# Patient Record
Sex: MALE | Race: WHITE | HISPANIC OR LATINO | Employment: OTHER | URBAN - METROPOLITAN AREA
[De-identification: names, ages, dates, MRNs, and addresses within clinical notes are randomized per-mention and may not be internally consistent; named-entity substitution may affect disease eponyms.]

---

## 2023-04-25 ENCOUNTER — OFFICE VISIT (OUTPATIENT)
Dept: URGENT CARE | Facility: CLINIC | Age: 48
End: 2023-04-25
Payer: OTHER MISCELLANEOUS

## 2023-04-25 VITALS
SYSTOLIC BLOOD PRESSURE: 120 MMHG | RESPIRATION RATE: 18 BRPM | OXYGEN SATURATION: 97 % | BODY MASS INDEX: 27.83 KG/M2 | DIASTOLIC BLOOD PRESSURE: 81 MMHG | TEMPERATURE: 98 F | WEIGHT: 163 LBS | HEART RATE: 76 BPM | HEIGHT: 64 IN

## 2023-04-25 DIAGNOSIS — M79.645 FINGER PAIN, LEFT: Primary | ICD-10-CM

## 2023-04-25 DIAGNOSIS — Z02.6 ENCOUNTER RELATED TO WORKER'S COMPENSATION CLAIM: ICD-10-CM

## 2023-04-25 PROCEDURE — 73130 X-RAY EXAM OF HAND: CPT | Mod: FY,LT,S$GLB, | Performed by: RADIOLOGY

## 2023-04-25 PROCEDURE — 73130 XR HAND COMPLETE 3 VIEW LEFT: ICD-10-PCS | Mod: FY,LT,S$GLB, | Performed by: RADIOLOGY

## 2023-04-25 PROCEDURE — 99203 OFFICE O/P NEW LOW 30 MIN: CPT | Mod: S$GLB,,,

## 2023-04-25 PROCEDURE — 99203 PR OFFICE/OUTPT VISIT, NEW, LEVL III, 30-44 MIN: ICD-10-PCS | Mod: S$GLB,,,

## 2023-04-25 NOTE — PROGRESS NOTES
Subjective:      Patient ID: Jax Mehta is a 47 y.o. male.    Chief Complaint: Hand Pain (Left pinky finger injury)    47 yr male pt presents to the clinic with left pinky finger pain. Pt states that he had a accident while he was at work and a co worker was making a hole and the drill bit grabbed his glove and twisted his finger and it dislocated his finger. Pt reports that he noticed his finger was dislocated and popped it back into place on Friday. Pt reports that since Friday he is still having pain especially when he hits it on something. Pt states that he can move the finger but it is hard to make a fist. Pt also reports swelling behind his left pinky finger. Pt denies any numbness or tingling. Treatments at home include icing and tylenol with no relief.  #[id number]    Hand Pain   The incident occurred 3 to 5 days ago. The incident occurred at work. The injury mechanism was twisted. The pain is present in the left fingers. The quality of the pain is described as aching. The pain does not radiate. The pain is at a severity of 7/10. The pain is moderate. The pain has been Fluctuating since the incident. Pertinent negatives include no chest pain, numbness or tingling. The symptoms are aggravated by movement. He has tried ice and acetaminophen for the symptoms. The treatment provided no relief.     Cardiovascular:  Negative for chest pain.   Musculoskeletal:  Positive for pain and abnormal ROM of joint.   Skin:  Negative for erythema.   Neurological:  Negative for numbness.   Objective:     Physical Exam  Constitutional:       General: He is not in acute distress.     Appearance: Normal appearance. He is normal weight. He is not ill-appearing.   HENT:      Head: Normocephalic and atraumatic.      Mouth/Throat:      Mouth: Mucous membranes are moist.   Eyes:      Extraocular Movements: Extraocular movements intact.      Pupils: Pupils are equal, round, and reactive to light.   Cardiovascular:       Rate and Rhythm: Normal rate and regular rhythm.      Heart sounds: Normal heart sounds. No murmur heard.  Pulmonary:      Effort: No respiratory distress.      Breath sounds: Normal breath sounds. No stridor. No wheezing, rhonchi or rales.   Musculoskeletal:      Left hand: Swelling and tenderness present. No bony tenderness. Normal strength. Normal capillary refill. Normal pulse.      Comments: TTP to left fifth finger, swelling throughout, cap refill <2 seconds, FROM (pain with flexion), radial pulse 2+   Skin:     General: Skin is warm and dry.      Capillary Refill: Capillary refill takes less than 2 seconds.      Findings: No bruising or erythema.   Neurological:      General: No focal deficit present.      Mental Status: He is alert and oriented to person, place, and time.    XR HAND COMPLETE 3 VIEW LEFT    Result Date: 4/25/2023  EXAMINATION: XR HAND COMPLETE 3 VIEW LEFT CLINICAL HISTORY: . Pain in left hand TECHNIQUE: PA, lateral, and oblique views of the left hand were performed. COMPARISON: None FINDINGS: Phalanges metacarpals and carpal bones are intact.  No significant bony abnormalities seen.     See above Electronically signed by: Reggie Carias MD Date:    04/25/2023 Time:    10:06     Assessment:      1. Finger pain, left      Plan:     Pt in no acute distress. Vitals reassuring. Reviewed negative xray of hand with pt. Discussed treatment with RICE, Tylenol/Motrin OTC and ghislaine tape. Discussed f/u with [facility] tomorrow, gave pt phone number to call and schedule appt. Discussed the importance of further evaluation if symptoms worsen. Patient stated verbal understanding.              No follow-ups on file.

## 2023-04-25 NOTE — PROGRESS NOTES
Subjective:      Patient ID: Jax Mehta is a 47 y.o. male.    Vitals:  vitals were not taken for this visit.     Chief Complaint: Hand Pain (Left pinky finger injury)    47 yr male present with left hand pink finger pain. Pt states that he had a accident he was at work and a co worker was making a hole and the drill bit grabbed his glove and twisted his finger and it dislocated his finger. So  he had to pop it back in place. Pt states that he can move the finger but is unable to make a fist. Finger appears to be swollen   Finger injury happened about Friday or Saturday last week.   Treatments at home include icing and tylenol         #[id number]    Hand Pain   The incident occurred more than 1 week ago. The incident occurred at work. The injury mechanism was twisted. The pain is present in the left hand. The quality of the pain is described as aching. The pain does not radiate. The pain is at a severity of 7/10. The pain has been Fluctuating since the incident. Nothing aggravates the symptoms. He has tried ice and acetaminophen for the symptoms. The treatment provided no relief.     ROS   Objective:     Physical Exam    Assessment:     No diagnosis found.    Plan:       There are no diagnoses linked to this encounter.

## 2023-04-25 NOTE — PATIENT INSTRUCTIONS
R.I.C.E:    Rest, apply ice intermittently, compress with ace wrap, and elevate above heart level as much as possible.  Do not apply ice directly to skin. Wrap ice in cloth before applying.    OTC Tylenol (acetaminophen) up to 4,000 mg a day is safe for short periods and can be used for pain, and fever. However in high doses and prolonged use it can cause liver irritation.    OTC Ibuprofen (NSAID) is a non-steroidal anti-inflammatory that can be used for pain. However it can also cause stomach irritation if over used.    Of note: Aleve, Motrin, Advil, Mobic, meloxicam, and indomethacin are also other names of NSAIDs.    Follow up with Occupational [facility]. Call [phone] to make appt for tomorrow.

## 2023-04-25 NOTE — LETTER
[facility]  [address] [place] [address]  Phone: [phone]  Fax: [phone]  [facility]: [email or web address]    Pt Name: Jax Mehta  Injury Date: 04/21/2023   Employee ID:  Date of First Treatment: 04/25/2023   Company: [employer] to [employer]       Appointment Time: 08:30 AM Arrived: 0905   Provider: Tita Valdez NP Time Out:1035     Office Treatment:   1. Finger pain, left                     Return Appointment: TBD by [facility]    [place]

## 2023-09-06 ENCOUNTER — HOSPITAL ENCOUNTER (EMERGENCY)
Facility: HOSPITAL | Age: 48
Discharge: HOME | End: 2023-09-06
Attending: EMERGENCY MEDICINE

## 2023-09-06 VITALS
DIASTOLIC BLOOD PRESSURE: 71 MMHG | TEMPERATURE: 99 F | HEART RATE: 63 BPM | OXYGEN SATURATION: 98 % | RESPIRATION RATE: 16 BRPM | BODY MASS INDEX: 28.49 KG/M2 | SYSTOLIC BLOOD PRESSURE: 120 MMHG | WEIGHT: 166 LBS

## 2023-09-06 DIAGNOSIS — M25.522 LEFT ELBOW PAIN: Primary | ICD-10-CM

## 2023-09-06 DIAGNOSIS — M25.522 ELBOW PAIN, LEFT: ICD-10-CM

## 2023-09-06 PROCEDURE — 99283 EMERGENCY DEPT VISIT LOW MDM: CPT

## 2023-09-06 PROCEDURE — 25000003 PHARM REV CODE 250

## 2023-09-06 RX ORDER — IBUPROFEN 600 MG/1
600 TABLET ORAL
Status: COMPLETED | OUTPATIENT
Start: 2023-09-06 | End: 2023-09-06

## 2023-09-06 RX ORDER — NAPROXEN 500 MG/1
500 TABLET ORAL 2 TIMES DAILY
Qty: 20 TABLET | Refills: 0 | Status: SHIPPED | OUTPATIENT
Start: 2023-09-06 | End: 2023-09-16

## 2023-09-06 RX ADMIN — IBUPROFEN 600 MG: 600 TABLET, FILM COATED ORAL at 12:09

## 2023-09-06 NOTE — DISCHARGE INSTRUCTIONS
Please take Naproxen as needed for pain. See discharge instructions for exercises and more information on your elbow pain.    Thank you for allowing me and my emergency team to take care of you here today! I hope you feel better soon. Please do not hesitate to return with any additional concerns that may arise from this or any new problem you encounter.    Our goal in the emergency department is to always give you outstanding care and exceptional service. If you receive a survey by mail or e-mail in the next week regarding your experience in our ED, we would greatly appreciate you completing it. Your feedback provides us with a way to recognize our staff who give very good care and it helps us learn how to improve when your experience was below the excellence we aspire to be!    Kelley Hendrickson PA-C

## 2023-09-06 NOTE — ED PROVIDER NOTES
Encounter Date: 9/6/2023       History     Chief Complaint   Patient presents with    Elbow Pain    Shoulder Pain     Pt states that he has been having elbow and shoulder pain x's 4 days. Denies injury states that he does lifting at work, no relief with  home remedies      Patient is a 47 year old male with no significant past medical history who presents to the emergency room for left elbow pain that onset 4 days ago. Patient states within the last 2 days pain started radiating up shoulder. 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 Pain exacerbated with movement, reportedly 10/10, with no alleviating factors. Patient denies fever, tingling, headache, body aches, other joint pain, nausea, or vomiting. Prior treatment includes Ibuprofen and unknown topical cream without relief.     The history is provided by the patient. A  was used.     Review of patient's allergies indicates:  No Known Allergies  No past medical history on file.  No past surgical history on file.  No family history on file.  Social History     Tobacco Use    Smoking status: Never    Smokeless tobacco: Never   Substance Use Topics    Alcohol use: Yes    Drug use: Never     Review of Systems   Constitutional:  Negative for chills, diaphoresis, fatigue and fever.   HENT:  Negative for congestion and sore throat.    Respiratory:  Negative for cough and shortness of breath.    Cardiovascular:  Negative for chest pain and palpitations.   Gastrointestinal:  Negative for abdominal pain, nausea and vomiting.   Musculoskeletal:  Positive for arthralgias (left elbow). Negative for back pain and joint swelling.   Skin:  Negative for rash and wound.   Neurological:  Negative for weakness, light-headedness, numbness and headaches.   Hematological:  Does not bruise/bleed easily.       Physical Exam     Initial Vitals   BP Pulse Resp Temp SpO2   09/06/23 1214  09/06/23 1210 09/06/23 1214 09/06/23 1210 09/06/23 1214   120/71 63 16 98.8 °F (37.1 °C) 98 %      MAP       --                Physical Exam    Vitals reviewed.  Constitutional: He appears well-developed and well-nourished. He is not diaphoretic. No distress.   HENT:   Head: Normocephalic and atraumatic.   Right Ear: External ear normal.   Left Ear: External ear normal.   Eyes: Conjunctivae and EOM are normal. Pupils are equal, round, and reactive to light. No scleral icterus.   Neck: Neck supple.   Normal range of motion.  Pulmonary/Chest: No respiratory distress.   Musculoskeletal:         General: No edema. Normal range of motion.      Right elbow: Normal.      Left elbow: No swelling, deformity or effusion. Normal range of motion. No lateral epicondyle tenderness.      Cervical back: Normal range of motion and neck supple.      Comments: ROM within normal limits. Strength 5/5.      Neurological: He is alert and oriented to person, place, and time. He has normal strength.   Skin: Skin is warm and dry. No erythema.   Psychiatric: He has a normal mood and affect. His behavior is normal. Thought content normal.         ED Course   Procedures  Labs Reviewed - No data to display       Imaging Results              X-Ray Elbow Complete Left (Final result)  Result time 09/06/23 13:05:17      Final result by Jamel Nguyen MD (09/06/23 13:05:17)                   Impression:      1. No acute displaced fracture or dislocation of the elbow.      Electronically signed by: Jamel Nguyen MD  Date:    09/06/2023  Time:    13:05               Narrative:    EXAMINATION:  XR ELBOW COMPLETE 3 VIEW LEFT    CLINICAL HISTORY:  Pain in left elbow    TECHNIQUE:  AP, lateral, and oblique views of the left elbow were performed.    COMPARISON:  None    FINDINGS:  Three views left elbow.    No significant displacement of the anterior or posterior elbow fat pads.  The anterior humeral line and radiocapitellar line are in appropriate  orientation.  No acute displaced fracture or dislocation of the elbow.  No radiopaque foreign body.                        Final result by Jamel Nguyen MD (09/06/23 13:05:17)                   Impression:      1. No acute displaced fracture or dislocation of the elbow.      Electronically signed by: Jamel Nguyen MD  Date:    09/06/2023  Time:    13:05               Narrative:    EXAMINATION:  XR ELBOW COMPLETE 3 VIEW LEFT    CLINICAL HISTORY:  Pain in left elbow    TECHNIQUE:  AP, lateral, and oblique views of the left elbow were performed.    COMPARISON:  None    FINDINGS:  Three views left elbow.    No significant displacement of the anterior or posterior elbow fat pads.  The anterior humeral line and radiocapitellar line are in appropriate orientation.  No acute displaced fracture or dislocation of the elbow.  No radiopaque foreign body.                                       Medications   ibuprofen tablet 600 mg (600 mg Oral Given 9/6/23 1227)     Medical Decision Making  Patient is a 47 year old male who presents to the emergency room for left elbow pain the onset 4 days ago. Vital signs stable and within normal limits. Physical exam remarkable for tenderness to medial epicondyle of left elbow. No swelling.     Differential diagnosis includes but is not limited to septic joint, bursitis, medial epicondylitis, fracture, or dislocation. Xray without fracture, dislocation, or signs of bursitis. Patient also afebrile with full ROM of elbow. Clinical presentation most suggestive of medial epicondylitis. Will discharge with Naproxen and advise follow up with a PCP.     On final assessment, the patient appears well and comfortable for discharge. I see no indication of an emergent process beyond that addressed during our encounter but have counseled the patient/family regarding the need for prompt follow-up as well as the indications that should prompt immediate return to the emergency room should new or  worrisome developments occur.  The patient/family has been provided with verbal and printed direction regarding our final diagnosis(es) as well as instructions regarding use of OTC and/or Rx medications intended to manage the patient's conditions. Patient verbalized understanding and is agreeable.     Amount and/or Complexity of Data Reviewed  Radiology: ordered.    Risk  Prescription drug management.              Attending Attestation:             Attending ED Notes:   Patient seen by the physician assistant, case discussed with me, I did not perform a face-to-face evaluation of this patient.                      Clinical Impression:   Final diagnoses:  [M25.522] Elbow pain, left  [M25.522] Left elbow pain (Primary)        ED Disposition Condition    Discharge Stable          ED Prescriptions       Medication Sig Dispense Start Date End Date Auth. Provider    naproxen (NAPROSYN) 500 MG tablet Take 1 tablet (500 mg total) by mouth 2 (two) times daily. for 10 days 20 tablet 9/6/2023 9/16/2023 Kelley Hendrickson PA-C          Follow-up Information       Follow up With Specialties Details Why Contact Info Additional Information    [facility] Schedule an appointment as soon as possible for a visit   [address], [address]  [place] [address] [phone] Please park in [address] or [address] and use [address]. Take [address] elevators.             Kelley Hendrickson PA-C  09/06/23 1413       Glenn Sharpe MD  09/06/23 7336